# Patient Record
Sex: FEMALE | Employment: PART TIME | URBAN - METROPOLITAN AREA
[De-identification: names, ages, dates, MRNs, and addresses within clinical notes are randomized per-mention and may not be internally consistent; named-entity substitution may affect disease eponyms.]

---

## 2022-04-11 ENCOUNTER — HISTORICAL (OUTPATIENT)
Dept: ADMINISTRATIVE | Facility: HOSPITAL | Age: 62
End: 2022-04-11

## 2022-04-29 VITALS
SYSTOLIC BLOOD PRESSURE: 137 MMHG | BODY MASS INDEX: 27.23 KG/M2 | DIASTOLIC BLOOD PRESSURE: 78 MMHG | HEIGHT: 67 IN | WEIGHT: 173.5 LBS

## 2022-09-14 DIAGNOSIS — E04.1 THYROID CYST: Primary | ICD-10-CM

## 2022-10-13 ENCOUNTER — OFFICE VISIT (OUTPATIENT)
Dept: OTOLARYNGOLOGY | Facility: CLINIC | Age: 62
End: 2022-10-13
Payer: MEDICAID

## 2022-10-13 VITALS
BODY MASS INDEX: 27.47 KG/M2 | DIASTOLIC BLOOD PRESSURE: 74 MMHG | SYSTOLIC BLOOD PRESSURE: 120 MMHG | TEMPERATURE: 98 F | WEIGHT: 175 LBS | HEART RATE: 88 BPM

## 2022-10-13 DIAGNOSIS — E04.1 THYROID NODULE: ICD-10-CM

## 2022-10-13 PROCEDURE — 99213 OFFICE O/P EST LOW 20 MIN: CPT | Mod: PBBFAC | Performed by: OTOLARYNGOLOGY

## 2022-10-13 RX ORDER — LISINOPRIL AND HYDROCHLOROTHIAZIDE 10; 12.5 MG/1; MG/1
1 TABLET ORAL DAILY
COMMUNITY
Start: 2022-10-04

## 2022-10-13 RX ORDER — OMEPRAZOLE 40 MG/1
40 CAPSULE, DELAYED RELEASE ORAL DAILY
COMMUNITY
Start: 2022-09-27

## 2022-10-13 RX ORDER — ESTRADIOL 1 MG/1
1 TABLET ORAL DAILY
COMMUNITY
Start: 2022-09-27

## 2022-10-13 NOTE — PROGRESS NOTES
Patient Name: Virginia Hernández   YOB: 1960     Chief Complaint:   Chief Complaint   Patient presents with    Thyroid Problem     Thyroid cyst        History of Present Illness:  October 13, 2022:   61-year-old female referred by her cardiologist, Dr. Acharya, for evaluation of a thyroid nodule or cyst which had been detected as an incidental finding on a recent carotid ultrasound.  Patient had been unaware of this lesion previously.  She has not had any swelling in her neck, difficulty swallowing, pressure sensation in the neck, hoarseness, or difficulty breathing.  She is not on any thyroid medication.  She did have blood work drawn yesterday including thyroid function studies but those are not available for review at this time.  Patient does not have a history of head and neck radiation.  There is no known family history of thyroid cancer or thyroid disease.  In reviewing her chart there is a thyroid ultrasound in the records that was done on August 7, 2014 which showed the presence of a solitary subcentimeter nodule within the right lobe of the thyroid gland.    Past Medical History:  Past Medical History:   Diagnosis Date    Hypertension      Past Surgical History:   Procedure Laterality Date    HYSTERECTOMY         Review of Systems:  Unremarkable except as mentioned above.    Current Medications:  Current Outpatient Medications   Medication Sig    estradioL (ESTRACE) 1 MG tablet Take 1 mg by mouth once daily.    lisinopriL-hydrochlorothiazide (PRINZIDE,ZESTORETIC) 10-12.5 mg per tablet Take 1 tablet by mouth once daily.    omeprazole (PRILOSEC) 40 MG capsule Take 40 mg by mouth once daily.     No current facility-administered medications for this visit.        Allergies:  Review of patient's allergies indicates:  No Known Allergies     Physical Exam:  Vital signs:   Vitals:    10/13/22 1433   BP: 120/74   Pulse: 88   Temp: 98.2 °F (36.8 °C)   TempSrc: Oral   Weight: 79.4 kg (175 lb)    General:  Well-developed well-nourished female in no acute distress.  Voice is normal.  Head and face:  Normocephalic.  No facial lesions.  No temporomandibular joint tenderness or click.  Ears:  Right ear-auricle is normally developed.  External auditory canal is clear.  Tympanic membrane is nonerythematous.  No middle ear effusion.  Left ear-auricle is normally developed.  External auditory canal is clear.  Tympanic membrane is nonerythematous.  No middle ear effusion.  Nose:  Nasal dorsum is unremarkable.  No significant septal deviation.  No significant intranasal congestion.  Secretions are clear.  Oral cavity and oropharynx:  Tongue and floor mouth are without lesions.  Mucosa is moist.  No pharyngeal erythema or exudates.  No oropharyngeal masses.  No tonsillar hypertrophy.  Neck:  Supple without adenopathy or thyromegaly.  Trachea is in the midline.  Parotid and submandibular glands are normal to palpation without masses or tenderness.  Eyes:  Extraocular muscles intact.  No nystagmus.  No exophthalmos or enophthalmos.  Neurologic:  Alert and oriented.  Cranial nerves 2-12 are grossly normal.      Assessment/Plan:  61-year-old female with thyroid nodule detected incidentally on recent carotid ultrasound.      Plan:  Schedule thyroid ultrasound for further evaluation.    We will try and obtain patient's recent lab work.    Telemedicine follow-up in 3 weeks.      Wolf Velazquez M.D.

## 2022-11-03 ENCOUNTER — OFFICE VISIT (OUTPATIENT)
Dept: OTOLARYNGOLOGY | Facility: CLINIC | Age: 62
End: 2022-11-03
Payer: MEDICAID

## 2022-11-03 DIAGNOSIS — E04.1 THYROID NODULE: Primary | ICD-10-CM

## 2022-11-03 NOTE — PROGRESS NOTES
Established Patient - Audio Only Telehealth Visit     The patient location is: [facility]  The chief complaint leading to consultation is: follow-up for thyroid nodule  Visit type: Virtual visit with audio only (telephone)  Total time spent with patient: 12 minutes       The reason for the audio only service rather than synchronous audio and video virtual visit was related to technical difficulties or patient preference/necessity.     Each patient to whom I provide medical services by telemedicine is:  (1) informed of the relationship between the physician and patient and the respective role of any other health care provider with respect to management of the patient; and (2) notified that they may decline to receive medical services by telemedicine and may withdraw from such care at any time. Patient verbally consented to receive this service via voice-only telephone call.       HPI: October 13, 2022:   61-year-old female referred by her cardiologist, Dr. Acharya, for evaluation of a thyroid nodule or cyst which had been detected as an incidental finding on a recent carotid ultrasound.  Patient had been unaware of this lesion previously.  She has not had any swelling in her neck, difficulty swallowing, pressure sensation in the neck, hoarseness, or difficulty breathing.  She is not on any thyroid medication.  She did have blood work drawn yesterday including thyroid function studies but those are not available for review at this time.  Patient does not have a history of head and neck radiation.  There is no known family history of thyroid cancer or thyroid disease.  In reviewing her chart there is a thyroid ultrasound in the records that was done on August 7, 2014 which showed the presence of a solitary subcentimeter nodule within the right lobe of the thyroid gland.    November 3, 2022:   Telemedicine appointment was carried out with the patient today to review the results of her thyroid ultrasound and lab  work.  Thyroid ultrasound done on October 14, 2022, showed the presence of a 1.1 cm spongiform nodule in the posterior aspect of the right lobe of the thyroid gland.  She also had a solid mixed isoechoic and hypoechoic nodule in the mid left thyroid lobe measuring 0.7 cm in diameter.  There were no microcalcifications.  Lab work from October 12, 2022 showed a TSH level and free T4 be normal.  Results were discussed with the patient.  She has no other new problems today.     Assessment and plan:    61-year-old female with multiple thyroid nodules.  Reassured the patient that this time that these appeared to be benign and that there was no findings which would suggest the need for needle biopsy at this time.  Patient does understand.      Plan:  Patient will be seen for follow-up in 6 months with preclinic TSH level and thyroid ultrasound.  She was uncomfortable waiting a full year to have these follow-up studies done.                        This service was not originating from a related E/M service provided within the previous 7 days nor will  to an E/M service or procedure within the next 24 hours or my soonest available appointment.  Prevailing standard of care was able to be met in this audio-only visit.

## 2023-03-08 ENCOUNTER — HOSPITAL ENCOUNTER (OUTPATIENT)
Dept: RADIOLOGY | Facility: HOSPITAL | Age: 63
Discharge: HOME | End: 2023-03-08
Attending: OTOLARYNGOLOGY
Payer: MEDICAID

## 2023-03-08 DIAGNOSIS — E04.1 THYROID NODULE: ICD-10-CM

## 2023-03-08 PROCEDURE — 76536 US EXAM OF HEAD AND NECK: CPT | Mod: TC

## 2023-05-01 ENCOUNTER — LAB VISIT (OUTPATIENT)
Dept: LAB | Facility: HOSPITAL | Age: 63
End: 2023-05-01
Attending: OTOLARYNGOLOGY
Payer: MEDICAID

## 2023-05-01 DIAGNOSIS — E04.1 THYROID NODULE: ICD-10-CM

## 2023-05-01 LAB — TSH SERPL-ACNC: 2.14 UIU/ML (ref 0.35–4.94)

## 2023-05-01 PROCEDURE — 84443 ASSAY THYROID STIM HORMONE: CPT

## 2023-05-01 PROCEDURE — 36415 COLL VENOUS BLD VENIPUNCTURE: CPT

## 2023-05-04 ENCOUNTER — OFFICE VISIT (OUTPATIENT)
Dept: OTOLARYNGOLOGY | Facility: CLINIC | Age: 63
End: 2023-05-04
Payer: MEDICAID

## 2023-05-04 VITALS
BODY MASS INDEX: 27.8 KG/M2 | DIASTOLIC BLOOD PRESSURE: 79 MMHG | HEIGHT: 66 IN | RESPIRATION RATE: 16 BRPM | WEIGHT: 173 LBS | HEART RATE: 74 BPM | TEMPERATURE: 98 F | SYSTOLIC BLOOD PRESSURE: 131 MMHG

## 2023-05-04 DIAGNOSIS — J31.0 CHRONIC RHINITIS: ICD-10-CM

## 2023-05-04 DIAGNOSIS — E04.1 THYROID NODULE: Primary | ICD-10-CM

## 2023-05-04 PROCEDURE — 99213 OFFICE O/P EST LOW 20 MIN: CPT | Mod: PBBFAC | Performed by: OTOLARYNGOLOGY

## 2023-05-04 RX ORDER — FLUTICASONE PROPIONATE 50 MCG
2 SPRAY, SUSPENSION (ML) NASAL DAILY
Qty: 15.8 ML | Refills: 6 | Status: SHIPPED | OUTPATIENT
Start: 2023-05-04

## 2023-05-04 NOTE — PROGRESS NOTES
Patient Name: Virginia Hernández   YOB: 1960     Chief Complaint:   No chief complaint on file.       History of Present Illness:  October 13, 2022:   61-year-old female referred by her cardiologist, Dr. Acharya, for evaluation of a thyroid nodule or cyst which had been detected as an incidental finding on a recent carotid ultrasound.  Patient had been unaware of this lesion previously.  She has not had any swelling in her neck, difficulty swallowing, pressure sensation in the neck, hoarseness, or difficulty breathing.  She is not on any thyroid medication.  She did have blood work drawn yesterday including thyroid function studies but those are not available for review at this time.  Patient does not have a history of head and neck radiation.  There is no known family history of thyroid cancer or thyroid disease.  In reviewing her chart there is a thyroid ultrasound in the records that was done on August 7, 2014 which showed the presence of a solitary subcentimeter nodule within the right lobe of the thyroid gland.    November 3, 2022:   Telemedicine appointment was carried out with the patient today to review the results of her thyroid ultrasound and lab work.  Thyroid ultrasound done on October 14, 2022, showed the presence of a 1.1 cm spongiform nodule in the posterior aspect of the right lobe of the thyroid gland.  She also had a solid mixed isoechoic and hypoechoic nodule in the mid left thyroid lobe measuring 0.7 cm in diameter.  There were no microcalcifications.  Lab work from October 12, 2022 showed a TSH level and free T4 be normal.  Results were discussed with the patient.  She has no other new problems today.    May 4, 2023:   Patient presents today for follow-up of her multinodular goiter.  Patient had not noticed any change in her neck.  She has no noticeable swelling, difficulty swallowing, hoarseness, or difficulty breathing.  Her recent TSH level was normal.  Follow-up thyroid  ultrasound done on March 8, 2023 showed essentially no change in her thyroid.  The largest nodule in the right is in the upper pole and is mixed lesion measuring 1.1 cm (TI-RADS 2).  She also has a 0.4 cm TI-RADS 4 nodule on the right.  On the left she has a 0.6 cm mixed lesion (TI-RADS 3) and another TI-RADS 30.5 cm nodule in the inferior pole of the left lobe.  Results were discussed with the patient.    A new problem that she has today is that of a several year history of perennial postnasal drainage associated with nasal congestion which is worse at night since moving to this area from [place] 15 years ago.  She is better in cooler weather and worse with warmer weather.  She does not have any discolored nasal drainage.  She does have periodic ocular drainage but no ocular itching or paroxysms of sneezing.  No sore throat or chronic cough.  She had been treated with Claritin in the past which had been ineffective.  She is not had any other treatment for this.  No history of recurrent acute sinusitis.  She is a nonsmoker.    Past Medical History:  Past Medical History:   Diagnosis Date    Hypertension      Past Surgical History:   Procedure Laterality Date    HYSTERECTOMY         Review of Systems:  Unremarkable except as mentioned above.    Current Medications:  Current Outpatient Medications   Medication Sig    estradioL (ESTRACE) 1 MG tablet Take 1 mg by mouth once daily.    lisinopriL-hydrochlorothiazide (PRINZIDE,ZESTORETIC) 10-12.5 mg per tablet Take 1 tablet by mouth once daily.    omeprazole (PRILOSEC) 40 MG capsule Take 40 mg by mouth once daily.     No current facility-administered medications for this visit.        Allergies:  Review of patient's allergies indicates:  No Known Allergies     Physical Exam:  Vital signs:   Vitals:    05/04/23 0755   BP: 131/79   BP Location: Left arm   Patient Position: Sitting   BP Method: Medium (Automatic)   Pulse: 74   Resp: 16   Temp: 98 °F (36.7 °C)   TempSrc: Oral  "  Weight: 78.5 kg (173 lb)   Height: 5' 6" (1.676 m)   General:  Well-developed well-nourished female in no acute distress.  Voice is normal.  Head and face:  Normocephalic.  No facial lesions.  No temporomandibular joint tenderness or click.  Ears:  Right ear-auricle is normally developed.  External auditory canal is clear.  Tympanic membrane is nonerythematous.  No middle ear effusion.  Left ear-auricle is normally developed.  External auditory canal is clear.  Tympanic membrane is nonerythematous.  No middle ear effusion.  Nose:  Nasal dorsum is unremarkable.  No significant septal deviation.  Mild to moderate intranasal congestion bilaterally.  Secretions are clear.  Oral cavity and oropharynx:  Tongue and floor mouth are without lesions.  Mucosa is moist.  No pharyngeal erythema or exudates.  No oropharyngeal masses.  No tonsillar hypertrophy.  Neck:  Supple without adenopathy or thyromegaly.  Trachea is in the midline.  Parotid and submandibular glands are normal to palpation without masses or tenderness.  Eyes:  Extraocular muscles intact.  No nystagmus.  No exophthalmos or enophthalmos.  Neurologic:  Alert and oriented.  Cranial nerves 2-12 are grossly normal.      Assessment/Plan:  62-year-old female with a multinodular goiter; none of the lesions concerning at this time.  Patient is euthyroid.    Chronic rhinitis.    Plan:  For her thyroid we will continue to monitor with serial exams and ultrasounds.  She will be seen for follow-up in 1 year with a preclinic TSH and thyroid ultrasound.  For her chronic rhinitis she will be placed on fluticasone nasal spray 2 puffs each nostril q.d. and saline irrigations 1-2 times daily.  She will be seen for follow-up in 2 months for this.      Wolf Velazquez M.D.        "

## 2024-05-02 ENCOUNTER — OFFICE VISIT (OUTPATIENT)
Dept: OTOLARYNGOLOGY | Facility: CLINIC | Age: 64
End: 2024-05-02
Payer: COMMERCIAL

## 2024-05-02 DIAGNOSIS — E04.1 THYROID NODULE: Primary | ICD-10-CM

## 2024-05-02 DIAGNOSIS — J31.0 CHRONIC RHINITIS: ICD-10-CM

## 2024-05-02 RX ORDER — FLUTICASONE PROPIONATE 50 MCG
2 SPRAY, SUSPENSION (ML) NASAL DAILY
Qty: 15.8 ML | Refills: 6 | Status: SHIPPED | OUTPATIENT
Start: 2024-05-02

## 2024-05-02 NOTE — LETTER
Dear Mrs. Virginia Hernández  Date: 05/02/2024         Mrs. Hernández had a visit with us on 5/2/2024. She stated that she had  laboratory tests completed during her last visit with you.  Please send a copy of the most recent labs that you have, especially those pertaining to her thyroid.      Please fax results to [phone].  If you have any questions, please feel free to call us.    Sincerely,      Provider Signature/Printed Name:Wolf Velazquez MD                   [facility]  [facility] [facility] [id number]

## 2024-05-02 NOTE — PROGRESS NOTES
Established Patient - Audio Only Telehealth Visit     The patient location is:  [place]  The chief complaint leading to consultation is:  Follow-up for goiter and chronic rhinitis  Visit type: Virtual visit with audio only (telephone)  Total time spent with patient:  10 minutes       The reason for the audio only service rather than synchronous audio and video virtual visit was related to technical difficulties or patient preference/necessity.     Each patient to whom I provide medical services by telemedicine is:  (1) informed of the relationship between the physician and patient and the respective role of any other health care provider with respect to management of the patient; and (2) notified that they may decline to receive medical services by telemedicine and may withdraw from such care at any time. Patient verbally consented to receive this service via voice-only telephone call.       HPI:   October 13, 2022:   61-year-old female referred by her cardiologist, Dr. Acharya, for evaluation of a thyroid nodule or cyst which had been detected as an incidental finding on a recent carotid ultrasound.  Patient had been unaware of this lesion previously.  She has not had any swelling in her neck, difficulty swallowing, pressure sensation in the neck, hoarseness, or difficulty breathing.  She is not on any thyroid medication.  She did have blood work drawn yesterday including thyroid function studies but those are not available for review at this time.  Patient does not have a history of head and neck radiation.  There is no known family history of thyroid cancer or thyroid disease.  In reviewing her chart there is a thyroid ultrasound in the records that was done on August 7, 2014 which showed the presence of a solitary subcentimeter nodule within the right lobe of the thyroid gland.    November 3, 2022:   Telemedicine appointment was carried out with the patient today to review the results of her thyroid ultrasound  and lab work.  Thyroid ultrasound done on October 14, 2022, showed the presence of a 1.1 cm spongiform nodule in the posterior aspect of the right lobe of the thyroid gland.  She also had a solid mixed isoechoic and hypoechoic nodule in the mid left thyroid lobe measuring 0.7 cm in diameter.  There were no microcalcifications.  Lab work from October 12, 2022 showed a TSH level and free T4 be normal.  Results were discussed with the patient.  She has no other new problems today.    May 4, 2023:   Patient presents today for follow-up of her multinodular goiter.  Patient had not noticed any change in her neck.  She has no noticeable swelling, difficulty swallowing, hoarseness, or difficulty breathing.  Her recent TSH level was normal.  Follow-up thyroid ultrasound done on March 8, 2023 showed essentially no change in her thyroid.  The largest nodule in the right is in the upper pole and is mixed lesion measuring 1.1 cm (TI-RADS 2).  She also has a 0.4 cm TI-RADS 4 nodule on the right.  On the left she has a 0.6 cm mixed lesion (TI-RADS 3) and another TI-RADS 30.5 cm nodule in the inferior pole of the left lobe.  Results were discussed with the patient.    A new problem that she has today is that of a several year history of perennial postnasal drainage associated with nasal congestion which is worse at night since moving to this area from [place] 15 years ago.  She is better in cooler weather and worse with warmer weather.  She does not have any discolored nasal drainage.  She does have periodic ocular drainage but no ocular itching or paroxysms of sneezing.  No sore throat or chronic cough.  She had been treated with Claritin in the past which had been ineffective.  She is not had any other treatment for this.  No history of recurrent acute sinusitis.  She is a nonsmoker.    5/2/2024:   Telemedicine appointment was carried out with the patient today for follow-up of her goiter and chronic rhinitis.  Patient had been  scheduled to have a thyroid ultrasound done prior to today's appointment but she had a change in her insurance and could not get it done at the scheduled time.  She would be able to proceed with it now.  She did have some lab work drawn with her primary care provider, Dr. Indu English, and she believes that this included thyroid function studies.  In regards to her chronic rhinitis she has been doing well recently.  She does need a refill for her fluticasone nasal spray.  She has been using it on p.r.n. basis.  She has no other new problems today.    Assessment and plan:    Multinodular goiter.    Chronic rhinitis.      Plan:  Reorder thyroid ultrasound.    It obtain lab work from the patient's primary care provider, Dr. Indu English.    Continue fluticasone nasal spray 2 puffs each nostril q.d. as needed and saline nasal irrigations as needed.    Telemedicine follow-up in 1 month to review lab work and thyroid ultrasound.                     This service was not originating from a related E/M service provided within the previous 7 days nor will  to an E/M service or procedure within the next 24 hours or my soonest available appointment.  Prevailing standard of care was able to be met in this audio-only visit.

## 2024-05-14 ENCOUNTER — HOSPITAL ENCOUNTER (OUTPATIENT)
Dept: RADIOLOGY | Facility: HOSPITAL | Age: 64
Discharge: HOME | End: 2024-05-14
Attending: OTOLARYNGOLOGY
Payer: COMMERCIAL

## 2024-05-14 DIAGNOSIS — E04.1 THYROID NODULE: ICD-10-CM

## 2024-05-14 PROCEDURE — 76536 US EXAM OF HEAD AND NECK: CPT | Mod: TC

## 2024-06-12 ENCOUNTER — OFFICE VISIT (OUTPATIENT)
Dept: OTOLARYNGOLOGY | Facility: CLINIC | Age: 64
End: 2024-06-12
Payer: COMMERCIAL

## 2024-06-12 DIAGNOSIS — E04.1 THYROID NODULE: Primary | ICD-10-CM

## 2024-06-12 DIAGNOSIS — J31.0 CHRONIC RHINITIS: ICD-10-CM

## 2024-06-12 RX ORDER — LISINOPRIL 2.5 MG/1
1 TABLET ORAL EVERY MORNING
COMMUNITY

## 2024-06-12 NOTE — PROGRESS NOTES
Established Patient - Audio Only Telehealth Visit     The patient location is:  [place]  The chief complaint leading to consultation is:  Review thyroid ultrasound results  Visit type: Virtual visit with audio only (telephone)  Total time spent with patient:  10 minutes       The reason for the audio only service rather than synchronous audio and video virtual visit was related to technical difficulties or patient preference/necessity.     Each patient to whom I provide medical services by telemedicine is:  (1) informed of the relationship between the physician and patient and the respective role of any other health care provider with respect to management of the patient; and (2) notified that they may decline to receive medical services by telemedicine and may withdraw from such care at any time. Patient verbally consented to receive this service via voice-only telephone call.       HPI: October 13, 2022:   61-year-old female referred by her cardiologist, Dr. Acharya, for evaluation of a thyroid nodule or cyst which had been detected as an incidental finding on a recent carotid ultrasound.  Patient had been unaware of this lesion previously.  She has not had any swelling in her neck, difficulty swallowing, pressure sensation in the neck, hoarseness, or difficulty breathing.  She is not on any thyroid medication.  She did have blood work drawn yesterday including thyroid function studies but those are not available for review at this time.  Patient does not have a history of head and neck radiation.  There is no known family history of thyroid cancer or thyroid disease.  In reviewing her chart there is a thyroid ultrasound in the records that was done on August 7, 2014 which showed the presence of a solitary subcentimeter nodule within the right lobe of the thyroid gland.    November 3, 2022:   Telemedicine appointment was carried out with the patient today to review the results of her thyroid ultrasound and lab  work.  Thyroid ultrasound done on October 14, 2022, showed the presence of a 1.1 cm spongiform nodule in the posterior aspect of the right lobe of the thyroid gland.  She also had a solid mixed isoechoic and hypoechoic nodule in the mid left thyroid lobe measuring 0.7 cm in diameter.  There were no microcalcifications.  Lab work from October 12, 2022 showed a TSH level and free T4 be normal.  Results were discussed with the patient.  She has no other new problems today.    May 4, 2023:   Patient presents today for follow-up of her multinodular goiter.  Patient had not noticed any change in her neck.  She has no noticeable swelling, difficulty swallowing, hoarseness, or difficulty breathing.  Her recent TSH level was normal.  Follow-up thyroid ultrasound done on March 8, 2023 showed essentially no change in her thyroid.  The largest nodule in the right is in the upper pole and is mixed lesion measuring 1.1 cm (TI-RADS 2).  She also has a 0.4 cm TI-RADS 4 nodule on the right.  On the left she has a 0.6 cm mixed lesion (TI-RADS 3) and another TI-RADS 30.5 cm nodule in the inferior pole of the left lobe.  Results were discussed with the patient.    A new problem that she has today is that of a several year history of perennial postnasal drainage associated with nasal congestion which is worse at night since moving to this area from [place] 15 years ago.  She is better in cooler weather and worse with warmer weather.  She does not have any discolored nasal drainage.  She does have periodic ocular drainage but no ocular itching or paroxysms of sneezing.  No sore throat or chronic cough.  She had been treated with Claritin in the past which had been ineffective.  She is not had any other treatment for this.  No history of recurrent acute sinusitis.  She is a nonsmoker.    5/2/2024:   Telemedicine appointment was carried out with the patient today for follow-up of her goiter and chronic rhinitis.  Patient had been scheduled  to have a thyroid ultrasound done prior to today's appointment but she had a change in her insurance and could not get it done at the scheduled time.  She would be able to proceed with it now.  She did have some lab work drawn with her primary care provider, Dr. Indu English, and she believes that this included thyroid function studies.  In regards to her chronic rhinitis she has been doing well recently.  She does need a refill for her fluticasone nasal spray.  She has been using it on p.r.n. basis.  She has no other new problems today.    6/12/2024:   Telemedicine appointment was carried out with the patient today to review thyroid ultrasound results.  Thyroid ultrasound done on 5/14/2024, showed presence of a 1.2 x 1.2 x 0.8 cm TI-RADS 2 spongiform nodule in the right lobe of the thyroid gland which was stable as compared to previous ultrasounds.  Patient also had 2 other subcentimeter lesions in the right lobe of the thyroid gland and 3 subcentimeter lesions on the left lobe of the thyroid gland.  Results were discussed with the patient.  She has no other new problems today.     Assessment and plan:    Multinodular goiter-stable.  Chronic rhinitis.      Plan:   Continue fluticasone nasal spray and saline nasal irrigations as needed.    Follow-up in 1 year with preclinic thyroid ultrasound.  We will continue to monitor with serial exams and ultrasounds.                     This service was not originating from a related E/M service provided within the previous 7 days nor will  to an E/M service or procedure within the next 24 hours or my soonest available appointment.  Prevailing standard of care was able to be met in this audio-only visit.

## 2025-06-12 ENCOUNTER — OFFICE VISIT (OUTPATIENT)
Dept: OTOLARYNGOLOGY | Facility: CLINIC | Age: 65
End: 2025-06-12
Payer: MEDICARE

## 2025-06-12 VITALS — DIASTOLIC BLOOD PRESSURE: 81 MMHG | HEART RATE: 79 BPM | TEMPERATURE: 98 F | SYSTOLIC BLOOD PRESSURE: 128 MMHG

## 2025-06-12 DIAGNOSIS — E04.1 THYROID NODULE: Primary | ICD-10-CM

## 2025-06-12 DIAGNOSIS — J31.0 CHRONIC RHINITIS: ICD-10-CM

## 2025-06-12 PROCEDURE — 99213 OFFICE O/P EST LOW 20 MIN: CPT | Mod: PBBFAC | Performed by: OTOLARYNGOLOGY

## 2025-06-12 RX ORDER — LANCETS 30 GAUGE
EACH MISCELLANEOUS
COMMUNITY
Start: 2025-05-18

## 2025-06-12 RX ORDER — LORATADINE 10 MG/1
10 TABLET ORAL
COMMUNITY
Start: 2025-01-15 | End: 2025-06-12 | Stop reason: ALTCHOICE

## 2025-06-12 RX ORDER — ASPIRIN 325 MG
50000 TABLET, DELAYED RELEASE (ENTERIC COATED) ORAL
COMMUNITY
Start: 2025-01-14

## 2025-06-12 RX ORDER — CETIRIZINE HYDROCHLORIDE 10 MG/1
10 TABLET ORAL EVERY MORNING
COMMUNITY
Start: 2024-12-27 | End: 2025-06-12 | Stop reason: SDUPTHER

## 2025-06-12 RX ORDER — CETIRIZINE HYDROCHLORIDE 10 MG/1
10 TABLET ORAL EVERY MORNING
Qty: 30 TABLET | Refills: 6 | Status: SHIPPED | OUTPATIENT
Start: 2025-06-12

## 2025-06-12 RX ORDER — FLUTICASONE PROPIONATE 50 MCG
2 SPRAY, SUSPENSION (ML) NASAL DAILY
Qty: 15.8 ML | Refills: 6 | Status: SHIPPED | OUTPATIENT
Start: 2025-06-12

## 2025-06-12 RX ORDER — ROSUVASTATIN CALCIUM 10 MG/1
10 TABLET, COATED ORAL
COMMUNITY
Start: 2025-04-11

## 2025-06-12 RX ORDER — METFORMIN HYDROCHLORIDE 500 MG/1
500 TABLET ORAL 2 TIMES DAILY
COMMUNITY
Start: 2025-06-09

## 2025-06-12 RX ORDER — AZELASTINE 1 MG/ML
1 SPRAY, METERED NASAL
COMMUNITY
Start: 2024-12-27 | End: 2025-12-27

## 2025-06-12 RX ORDER — ESTRADIOL 0.03 MG/D
1 PATCH, EXTENDED RELEASE TRANSDERMAL
COMMUNITY
Start: 2025-05-17

## 2025-06-12 NOTE — PROGRESS NOTES
Patient Name: Virginia Hernández   YOB: 1960     Chief Complaint:   Chief Complaint   Patient presents with    Follow-up        History of Present Illness:   October 13, 2022:   61-year-old female referred by her cardiologist, Dr. Acharya, for evaluation of a thyroid nodule or cyst which had been detected as an incidental finding on a recent carotid ultrasound.  Patient had been unaware of this lesion previously.  She has not had any swelling in her neck, difficulty swallowing, pressure sensation in the neck, hoarseness, or difficulty breathing.  She is not on any thyroid medication.  She did have blood work drawn yesterday including thyroid function studies but those are not available for review at this time.  Patient does not have a history of head and neck radiation.  There is no known family history of thyroid cancer or thyroid disease.  In reviewing her chart there is a thyroid ultrasound in the records that was done on August 7, 2014 which showed the presence of a solitary subcentimeter nodule within the right lobe of the thyroid gland.     November 3, 2022:   Telemedicine appointment was carried out with the patient today to review the results of her thyroid ultrasound and lab work.  Thyroid ultrasound done on October 14, 2022, showed the presence of a 1.1 cm spongiform nodule in the posterior aspect of the right lobe of the thyroid gland.  She also had a solid mixed isoechoic and hypoechoic nodule in the mid left thyroid lobe measuring 0.7 cm in diameter.  There were no microcalcifications.  Lab work from October 12, 2022 showed a TSH level and free T4 be normal.  Results were discussed with the patient.  She has no other new problems today.     May 4, 2023:   Patient presents today for follow-up of her multinodular goiter.  Patient had not noticed any change in her neck.  She has no noticeable swelling, difficulty swallowing, hoarseness, or difficulty breathing.  Her recent TSH level was  normal.  Follow-up thyroid ultrasound done on March 8, 2023 showed essentially no change in her thyroid.  The largest nodule in the right is in the upper pole and is mixed lesion measuring 1.1 cm (TI-RADS 2).  She also has a 0.4 cm TI-RADS 4 nodule on the right.  On the left she has a 0.6 cm mixed lesion (TI-RADS 3) and another TI-RADS 30.5 cm nodule in the inferior pole of the left lobe.  Results were discussed with the patient.    A new problem that she has today is that of a several year history of perennial postnasal drainage associated with nasal congestion which is worse at night since moving to this area from [place] 15 years ago.  She is better in cooler weather and worse with warmer weather.  She does not have any discolored nasal drainage.  She does have periodic ocular drainage but no ocular itching or paroxysms of sneezing.  No sore throat or chronic cough.  She had been treated with Claritin in the past which had been ineffective.  She is not had any other treatment for this.  No history of recurrent acute sinusitis.  She is a nonsmoker.     5/2/2024:   Telemedicine appointment was carried out with the patient today for follow-up of her goiter and chronic rhinitis.  Patient had been scheduled to have a thyroid ultrasound done prior to today's appointment but she had a change in her insurance and could not get it done at the scheduled time.  She would be able to proceed with it now.  She did have some lab work drawn with her primary care provider, Dr. Indu English, and she believes that this included thyroid function studies.  In regards to her chronic rhinitis she has been doing well recently.  She does need a refill for her fluticasone nasal spray.  She has been using it on p.r.n. basis.  She has no other new problems today.     6/12/2024:   Telemedicine appointment was carried out with the patient today to review thyroid ultrasound results.  Thyroid ultrasound done on 5/14/2024, showed presence of  a 1.2 x 1.2 x 0.8 cm TI-RADS 2 spongiform nodule in the right lobe of the thyroid gland which was stable as compared to previous ultrasounds.  Patient also had 2 other subcentimeter lesions in the right lobe of the thyroid gland and 3 subcentimeter lesions on the left lobe of the thyroid gland.  Results were discussed with the patient.  She has no other new problems today.    6/12/2025:   Patient presents today for follow-up of thyroid nodule and chronic rhinitis.    In regards to her thyroid she has not noticed any change in her neck.  No noticeable swelling, difficulty swallowing, chronic hoarseness, or difficulty breathing.  She reported that her primary care physician, Dr. Mendosa, had obtained a thyroid ultrasound January 2025 which showed no change in the thyroid.  That report in those scans are not available for review at this time.  In regards to her rhinitis patient has been using fluticasone nasal spray only as needed but has not really found that the has been very helpful with her sinus congestion and pressure.  She has also used loratadine but that is ineffective.  Her sinus congestion and pressure occur primarily in the medial canthal regions and is associated with postnasal drainage especially at night.  Of note she does have a history of gastroesophageal reflux disease in his on medication for this which he takes his a PRN basis which does seem to help control this.  Currently she does not have any discolored nasal drainage, cough, or sore throat.  She is unaware of any specific modifying factors.  Of note she has noticed in the past that saline irrigations do help somewhat.    Past Medical History:  Past Medical History:   Diagnosis Date    Hypertension     Seasonal allergies 2018    Sinum     Past Surgical History:   Procedure Laterality Date    HYSTERECTOMY         Social History:  Social History[1]    Review of Systems:  Unremarkable except as mentioned above.    Current Medications:  Current  Outpatient Medications   Medication Sig    azelastine (ASTELIN) 137 mcg (0.1 %) nasal spray 1 spray by Nasal route.    CORTNEY 0.025 mg/24 hr 1 patch twice a week.    lisinopriL (PRINIVIL,ZESTRIL) 2.5 MG tablet Take 1 tablet by mouth every morning.    cetirizine (ZYRTEC) 10 MG tablet Take 1 tablet (10 mg total) by mouth every morning.    cholecalciferol, vitamin D3, 1,250 mcg (50,000 unit) capsule Take 50,000 Units by mouth every 7 days. (Patient not taking: Reported on 6/12/2025)    estradioL (ESTRACE) 1 MG tablet Take 1 mg by mouth once daily. (Patient not taking: Reported on 6/12/2025)    fluticasone propionate (FLONASE) 50 mcg/actuation nasal spray 2 sprays (100 mcg total) by Each Nostril route once daily.    lisinopriL-hydrochlorothiazide (PRINZIDE,ZESTORETIC) 10-12.5 mg per tablet Take 1 tablet by mouth once daily. (Patient not taking: Reported on 6/12/2025)    metFORMIN (GLUCOPHAGE) 500 MG tablet Take 500 mg by mouth 2 (two) times daily. (Patient not taking: Reported on 6/12/2025)    omeprazole (PRILOSEC) 40 MG capsule Take 40 mg by mouth once daily. (Patient not taking: Reported on 6/12/2025)    ONETOUCH VERIO FLEX METER Misc SMARTSIG:Daily (Patient not taking: Reported on 6/12/2025)    rosuvastatin (CRESTOR) 10 MG tablet Take 10 mg by mouth. (Patient not taking: Reported on 6/12/2025)     No current facility-administered medications for this visit.        Allergies:  Review of patient's allergies indicates:  No Known Allergies     Family History:  Family History   Problem Relation Name Age of Onset    Allergies Sister Karolina &ling Mccord     Cancer Sister Deena Livingston cancer       Physical Exam:  Vital signs:   Vitals:    06/12/25 0855   BP: 128/81   BP Location: Right arm   Patient Position: Sitting   Pulse: 79   Temp: 98.1 °F (36.7 °C)   TempSrc: Oral   General: Well-developed well-nourished female in no acute distress.  Voice is normal.  Head and face: Normocephalic.  No facial lesions.   No temporomandibular joint tenderness or click.  Ears: Right ear-auricle is normally developed.  External auditory canal is clear.  Tympanic membrane is nonerythematous.  No middle ear effusion.    Left ear-auricle is normally developed.  External auditory canal is clear.  Tympanic membrane is nonerythematous.  No middle ear effusion.  Nose: Mild to moderate congestion with mucoid secretions bilaterally.  No purulent drainage.  No polyps on anterior rhinoscopy.  Oral cavity and oropharynx: Tongue and floor mouth are without lesions.  Mucosa is moist.  No pharyngeal erythema or exudates.  No oropharyngeal masses.  No tonsillar hypertrophy.  Neck: Supple without adenopathy or thyromegaly.  Trachea is in the midline.  Parotid and submandibular glands are normal to palpation without masses or tenderness.  Eyes: Extraocular muscles intact.  No nystagmus.  No exophthalmos or enophthalmos.  Neurologic: Alert and oriented.  Cranial nerves 2-12 are grossly normal.      Assessment/Plan:  Multinodular goiter-stable.    Chronic rhinosinusitis.    Plan:   We will obtain thyroid ultrasound report from the patient's primary care provider.  For her chronic rhinosinusitis she will be placed on fluticasone nasal spray 2 puffs each nostril q.d. on a routine basis, cetirizine 10 mg p.o. q.d.., and saline nasal irrigations.    RAST test for region 6.    Follow-up in 2 months.  If her nasal and sinus symptoms have not improved then consider CT scan of the sinuses.    Wolf Velazquez M.D.       [1]   Social History  Socioeconomic History    Marital status: Single   Tobacco Use    Smoking status: Every Day     Types: Cigarettes    Smokeless tobacco: Never   Substance and Sexual Activity    Alcohol use: Yes    Drug use: Never